# Patient Record
Sex: MALE | Race: WHITE
[De-identification: names, ages, dates, MRNs, and addresses within clinical notes are randomized per-mention and may not be internally consistent; named-entity substitution may affect disease eponyms.]

---

## 2019-02-14 ENCOUNTER — HOSPITAL ENCOUNTER (OUTPATIENT)
Dept: HOSPITAL 95 - LAB SHORT | Age: 64
Discharge: HOME | End: 2019-02-14
Attending: OTOLARYNGOLOGY
Payer: COMMERCIAL

## 2019-02-14 DIAGNOSIS — C79.9: ICD-10-CM

## 2019-02-14 DIAGNOSIS — C78.00: Primary | ICD-10-CM

## 2019-03-08 ENCOUNTER — HOSPITAL ENCOUNTER (OUTPATIENT)
Dept: HOSPITAL 95 - ORSCMMR | Age: 64
Discharge: HOME | End: 2019-03-08
Attending: SURGERY
Payer: COMMERCIAL

## 2019-03-08 VITALS — HEIGHT: 70 IN | WEIGHT: 141.01 LBS | BODY MASS INDEX: 20.19 KG/M2

## 2019-03-08 DIAGNOSIS — R01.1: ICD-10-CM

## 2019-03-08 DIAGNOSIS — R06.02: ICD-10-CM

## 2019-03-08 DIAGNOSIS — C10.9: Primary | ICD-10-CM

## 2019-03-08 DIAGNOSIS — Z79.899: ICD-10-CM

## 2019-03-08 DIAGNOSIS — I10: ICD-10-CM

## 2019-03-08 PROCEDURE — B5131ZA FLUOROSCOPY OF RIGHT JUGULAR VEINS USING LOW OSMOLAR CONTRAST, GUIDANCE: ICD-10-PCS | Performed by: SURGERY

## 2019-03-08 PROCEDURE — 05HM33Z INSERTION OF INFUSION DEVICE INTO RIGHT INTERNAL JUGULAR VEIN, PERCUTANEOUS APPROACH: ICD-10-PCS | Performed by: SURGERY

## 2019-03-08 PROCEDURE — C1788 PORT, INDWELLING, IMP: HCPCS

## 2019-03-08 NOTE — NUR
Ambulatory in Day Surgery History, Chart, Medications and Allergies reviewed
before start of procedure.Lungs clear T/O to Auscultation.  Patient confirms
NPO status and agrees with scheduled surgery.  Patient reports completing
Chlorhexadine shower X2 prior to admission to hospital.Surgical site prepped
with 2% Chlorhexidine cloth wipe.  Patient States Post-Procedure ride home has
been arranged.

## 2019-03-08 NOTE — NUR
Discharge instructions reviewed with patient. Patient verbalizes understanding.
Copy given to patient to take home.
Discharged via wheelchair to private car for ride home.PATIENT AND S/O DENY
ANY QUESTIONS OR CONCERNS R/T DISCHARGE

## 2019-03-12 ENCOUNTER — HOSPITAL ENCOUNTER (OUTPATIENT)
Dept: HOSPITAL 95 - CT | Age: 64
Discharge: HOME | End: 2019-03-12
Attending: INTERNAL MEDICINE
Payer: COMMERCIAL

## 2019-03-12 DIAGNOSIS — Z88.5: ICD-10-CM

## 2019-03-12 DIAGNOSIS — M10.9: ICD-10-CM

## 2019-03-12 DIAGNOSIS — C78.7: Primary | ICD-10-CM

## 2019-03-12 DIAGNOSIS — Z85.810: ICD-10-CM

## 2019-03-12 DIAGNOSIS — I10: ICD-10-CM

## 2019-03-12 DIAGNOSIS — C78.02: ICD-10-CM

## 2019-03-12 DIAGNOSIS — C78.01: ICD-10-CM

## 2019-03-12 DIAGNOSIS — C10.9: ICD-10-CM

## 2019-03-12 DIAGNOSIS — M19.90: ICD-10-CM

## 2019-03-12 NOTE — NUR
PT DENIES ANY PAIN, NAUSEA, SOB. SITE TO R CHECT WALL WITH BANDAID CLEAN DRY
INTAT. NO SWELLING. PT REQUESTING TO AMBULATE OUTR

## 2019-05-29 ENCOUNTER — HOSPITAL ENCOUNTER (INPATIENT)
Dept: HOSPITAL 95 - ER | Age: 64
LOS: 10 days | DRG: 871 | End: 2019-06-08
Attending: HOSPITALIST | Admitting: HOSPITALIST
Payer: COMMERCIAL

## 2019-05-29 VITALS — BODY MASS INDEX: 21.58 KG/M2 | WEIGHT: 142.42 LBS | HEIGHT: 67.99 IN

## 2019-05-29 DIAGNOSIS — R56.9: ICD-10-CM

## 2019-05-29 DIAGNOSIS — E87.6: ICD-10-CM

## 2019-05-29 DIAGNOSIS — I63.9: ICD-10-CM

## 2019-05-29 DIAGNOSIS — E87.2: ICD-10-CM

## 2019-05-29 DIAGNOSIS — E78.00: ICD-10-CM

## 2019-05-29 DIAGNOSIS — E87.1: ICD-10-CM

## 2019-05-29 DIAGNOSIS — G81.94: ICD-10-CM

## 2019-05-29 DIAGNOSIS — C78.7: ICD-10-CM

## 2019-05-29 DIAGNOSIS — G93.41: ICD-10-CM

## 2019-05-29 DIAGNOSIS — E86.0: ICD-10-CM

## 2019-05-29 DIAGNOSIS — C01: ICD-10-CM

## 2019-05-29 DIAGNOSIS — C13.9: ICD-10-CM

## 2019-05-29 DIAGNOSIS — A41.9: Primary | ICD-10-CM

## 2019-05-29 DIAGNOSIS — I10: ICD-10-CM

## 2019-05-29 DIAGNOSIS — C78.01: ICD-10-CM

## 2019-05-29 DIAGNOSIS — D64.81: ICD-10-CM

## 2019-05-29 DIAGNOSIS — C79.89: ICD-10-CM

## 2019-05-29 DIAGNOSIS — C78.02: ICD-10-CM

## 2019-05-29 DIAGNOSIS — C77.1: ICD-10-CM

## 2019-05-29 LAB
ALBUMIN SERPL BCP-MCNC: 3.3 G/DL (ref 3.4–5)
ALBUMIN/GLOB SERPL: 0.9 {RATIO} (ref 0.8–1.8)
ALT SERPL W P-5'-P-CCNC: 20 U/L (ref 12–78)
ANION GAP SERPL CALCULATED.4IONS-SCNC: 18 MMOL/L (ref 6–16)
AST SERPL W P-5'-P-CCNC: 11 U/L (ref 12–37)
BASOPHILS # BLD: 0 K/MM3 (ref 0–0.23)
BASOPHILS NFR BLD: 0 % (ref 0–2)
BILIRUB SERPL-MCNC: 0.2 MG/DL (ref 0.1–1)
BUN SERPL-MCNC: 36 MG/DL (ref 8–24)
CALCIUM SERPL-MCNC: 8.2 MG/DL (ref 8.5–10.1)
CHLORIDE SERPL-SCNC: 99 MMOL/L (ref 98–108)
CO2 SERPL-SCNC: 17 MMOL/L (ref 21–32)
CREAT SERPL-MCNC: 1.02 MG/DL (ref 0.6–1.2)
DEPRECATED RDW RBC AUTO: 80.2 FL (ref 35.1–46.3)
EOSINOPHIL # BLD: 0 K/MM3 (ref 0–0.68)
EOSINOPHIL NFR BLD: 0 % (ref 0–6)
ERYTHROCYTE [DISTWIDTH] IN BLOOD BY AUTOMATED COUNT: 22 % (ref 11.7–14.2)
GLOBULIN SER CALC-MCNC: 3.6 G/DL (ref 2.2–4)
GLUCOSE SERPL-MCNC: 188 MG/DL (ref 70–99)
HCT VFR BLD AUTO: 28.9 % (ref 37–53)
HGB BLD-MCNC: 9.5 G/DL (ref 13.5–17.5)
LYMPHOCYTES # BLD: 2.29 K/MM3 (ref 0.84–5.2)
LYMPHOCYTES NFR BLD: 9 % (ref 21–46)
MCHC RBC AUTO-ENTMCNC: 32.9 G/DL (ref 31.5–36.5)
MCV RBC AUTO: 101 FL (ref 80–100)
MONOCYTES # BLD: 0.5 K/MM3 (ref 0.16–1.47)
MONOCYTES NFR BLD: 2 % (ref 4–13)
NEUTS BAND NFR BLD MANUAL: 10 % (ref 0–8)
NEUTS SEG # BLD MANUAL: 22.68 K/MM3 (ref 1.96–9.15)
NEUTS SEG NFR BLD MANUAL: 79 % (ref 41–73)
NRBC # BLD AUTO: 0 K/MM3 (ref 0–0.02)
NRBC BLD AUTO-RTO: 0 /100 WBC (ref 0–0.2)
PLATELET # BLD AUTO: 251 K/MM3 (ref 150–400)
POTASSIUM SERPL-SCNC: 4.4 MMOL/L (ref 3.5–5.5)
PROT SERPL-MCNC: 6.9 G/DL (ref 6.4–8.2)
SODIUM SERPL-SCNC: 134 MMOL/L (ref 136–145)
TOTAL CELLS COUNTED BLD: 100

## 2019-05-29 PROCEDURE — A9270 NON-COVERED ITEM OR SERVICE: HCPCS

## 2019-05-29 PROCEDURE — P9016 RBC LEUKOCYTES REDUCED: HCPCS

## 2019-05-29 PROCEDURE — G0378 HOSPITAL OBSERVATION PER HR: HCPCS

## 2019-05-29 PROCEDURE — A9577 INJ MULTIHANCE: HCPCS

## 2019-05-29 PROCEDURE — G0515 COGNITIVE SKILLS DEVELOPMENT: HCPCS

## 2019-05-30 LAB
ALBUMIN SERPL BCP-MCNC: 3.1 G/DL (ref 3.4–5)
ALBUMIN/GLOB SERPL: 0.9 {RATIO} (ref 0.8–1.8)
ALT SERPL W P-5'-P-CCNC: 18 U/L (ref 12–78)
ANION GAP SERPL CALCULATED.4IONS-SCNC: 7 MMOL/L (ref 6–16)
AST SERPL W P-5'-P-CCNC: 14 U/L (ref 12–37)
BILIRUB SERPL-MCNC: 0.4 MG/DL (ref 0.1–1)
BUN SERPL-MCNC: 28 MG/DL (ref 8–24)
CALCIUM SERPL-MCNC: 8.2 MG/DL (ref 8.5–10.1)
CHLORIDE SERPL-SCNC: 99 MMOL/L (ref 98–108)
CO2 SERPL-SCNC: 25 MMOL/L (ref 21–32)
CREAT SERPL-MCNC: 0.95 MG/DL (ref 0.6–1.2)
DEPRECATED RDW RBC AUTO: 77.3 FL (ref 35.1–46.3)
ERYTHROCYTE [DISTWIDTH] IN BLOOD BY AUTOMATED COUNT: 21.8 % (ref 11.7–14.2)
GLOBULIN SER CALC-MCNC: 3.4 G/DL (ref 2.2–4)
GLUCOSE SERPL-MCNC: 89 MG/DL (ref 70–99)
HCT VFR BLD AUTO: 26.6 % (ref 37–53)
HGB BLD-MCNC: 8.7 G/DL (ref 13.5–17.5)
MCHC RBC AUTO-ENTMCNC: 32.7 G/DL (ref 31.5–36.5)
MCV RBC AUTO: 99 FL (ref 80–100)
NRBC # BLD AUTO: 0 K/MM3 (ref 0–0.02)
NRBC BLD AUTO-RTO: 0 /100 WBC (ref 0–0.2)
PLATELET # BLD AUTO: 215 K/MM3 (ref 150–400)
POTASSIUM SERPL-SCNC: 4 MMOL/L (ref 3.5–5.5)
PROT SERPL-MCNC: 6.5 G/DL (ref 6.4–8.2)
SODIUM SERPL-SCNC: 131 MMOL/L (ref 136–145)
SP GR SPEC: 1.01 (ref 1–1.02)
UROBILINOGEN UR STRIP-MCNC: (no result) MG/DL

## 2019-05-30 NOTE — NUR
PT A/OX3, PLEASANT AND COOPERATIVE, THE PT IS UP WITH 1 ASSIST TO THE
BATHROOM, THE PT APPEARED TO BE BREATHING EASILY T/O THE SHIFT, THE PT DENIED
ANY PAIN TODAY, THIS AFTERNOON THE PT HAD AN EPISODE IN WHICH HE WAS
HALUCINATING AND HAD A BLANK STARE ON HIS FACE, DID NOT APPEAR TO BE SEIZURE
ACTIVTY AT THAT TIME, FAMILY HAS BEEN WITH THE PT T/O THE DAY, DR. SILVER WAS
CONSULTED THIS AM, HOWEVER HAS NOT BEEN IN TO SEE THE PT SO FAR THIS SHIFT,
CALL LIGHT IN REACH, WILL CONTINUE TO MONITOR AND ASSESS FOR CHANGES

## 2019-05-30 NOTE — NUR
SUMMARY: NEW ADMIT SEIZURE ACTIVITY ON HOSPITALIST SERVICE. NO SEIZURE
ACTIVITY NOTED THIS SHIFT, VSS, ROOM AIR, AFEBRILE, TELE NS IN 60'S.
PT USES CALL LIGHT APPROPRIATELY, REMAINS A&O X4. AWAIT PT VOID FOR URINE
SPECIMEN COLLECTION.

## 2019-05-30 NOTE — NUR
0208 ADMIT: PT ARRIVES FROM ER VIA GOURNEY WITH SOJEFFY, @ BEDSIDE;
TRANSFERS SELF TO BED AND APPEARS IMPULSIVE AND MILDLY UNSTEADY. PT DENIES
DIZZINESS, LIGHTHEADEDNESS OR SOB WITH TRANSFER AND AMBULATION INTO ROOM. PT
AND SO EDUCATED R/T ROOM, BED, CALL LIGHT AND FALL PRECAUTIONS. PT URINAL
PLACED AT BEDSIDE AND BED ALARM ENGAGED. PT ADMITS TO BEING IMPULSIVE AND
FORGETFUL @ TIMES AND AGREES TO USE CALL LIGHT PRIOR TO BRP OR GETTING OUT OF
BED.

## 2019-05-31 LAB
ALBUMIN SERPL BCP-MCNC: 2.9 G/DL (ref 3.4–5)
ANION GAP SERPL CALCULATED.4IONS-SCNC: 8 MMOL/L (ref 6–16)
BASOPHILS # BLD AUTO: 0.02 K/MM3 (ref 0–0.23)
BASOPHILS NFR BLD AUTO: 0 % (ref 0–2)
BUN SERPL-MCNC: 21 MG/DL (ref 8–24)
CALCIUM SERPL-MCNC: 7.6 MG/DL (ref 8.5–10.1)
CHLORIDE SERPL-SCNC: 94 MMOL/L (ref 98–108)
CO2 SERPL-SCNC: 26 MMOL/L (ref 21–32)
CREAT SERPL-MCNC: 0.84 MG/DL (ref 0.6–1.2)
DEPRECATED RDW RBC AUTO: 75.5 FL (ref 35.1–46.3)
EOSINOPHIL # BLD AUTO: 0.01 K/MM3 (ref 0–0.68)
EOSINOPHIL NFR BLD AUTO: 0 % (ref 0–6)
ERYTHROCYTE [DISTWIDTH] IN BLOOD BY AUTOMATED COUNT: 20.9 % (ref 11.7–14.2)
GLUCOSE SERPL-MCNC: 86 MG/DL (ref 70–99)
HCT VFR BLD AUTO: 25 % (ref 37–53)
HGB BLD-MCNC: 8.4 G/DL (ref 13.5–17.5)
IMM GRANULOCYTES # BLD AUTO: 0.12 K/MM3 (ref 0–0.1)
IMM GRANULOCYTES NFR BLD AUTO: 1 % (ref 0–1)
LYMPHOCYTES # BLD AUTO: 2.07 K/MM3 (ref 0.84–5.2)
LYMPHOCYTES NFR BLD AUTO: 16 % (ref 21–46)
MCHC RBC AUTO-ENTMCNC: 33.6 G/DL (ref 31.5–36.5)
MCV RBC AUTO: 99 FL (ref 80–100)
MONOCYTES # BLD AUTO: 0.58 K/MM3 (ref 0.16–1.47)
MONOCYTES NFR BLD AUTO: 5 % (ref 4–13)
NEUTROPHILS # BLD AUTO: 10.04 K/MM3 (ref 1.96–9.15)
NEUTROPHILS NFR BLD AUTO: 78 % (ref 41–73)
NRBC # BLD AUTO: 0 K/MM3 (ref 0–0.02)
NRBC BLD AUTO-RTO: 0 /100 WBC (ref 0–0.2)
PHOSPHATE SERPL-MCNC: 3.1 MG/DL (ref 2.5–4.9)
PLATELET # BLD AUTO: 176 K/MM3 (ref 150–400)
POTASSIUM SERPL-SCNC: 3.3 MMOL/L (ref 3.5–5.5)
SODIUM SERPL-SCNC: 128 MMOL/L (ref 136–145)

## 2019-05-31 NOTE — NUR
PATIENT INCREASED CONFUSION. SPOUSE/RN REQUEST ATIVAN 1 MG PRN FOR THIS
EVENING. HOSPITALIST DR MARKHAM ORDERED ATIVAN PO 1 MG X ONE.

## 2019-05-31 NOTE — NUR
SHIFT SUMMARY
PT SLEPT FAIR, REQUESTED HOME DOSE OF PO ATIVAN SO THAT HE COULD SLEEP.
HOSPITALIST CALLD AND ORDER RECEIVED. IVF'S INFUSING PER PUMP WITHOUT
DIFFICULTY. WIFE WENT HOME FOR THE NIGHT. PT REQUESTED TYLENOL FOR THE CHILLS
EARLY ON IN THE EVENING. TELE SHOWS NSR. NO ACUTE EVENTS NOTED DURING THE
NIGHT, WILL CONTINUE TO MONITOR.

## 2019-05-31 NOTE — NUR
NOTIFIED DR. ARREOLA PT PULLED IV OUT AND IS CONFUSED. DR. ARREOLA SAID
TO PLACE ANOTHER IV FOR ANTIBIOTICS. NOTIFIED
CHARGE RN, VINEET
PT PULLED OUT IV, UNSTEADY
ON THEIR FEET, CONFUSED, ATTEMPTING TO GET OUT OF BED AND DOES NOT
CALL APPROPRIATELY. PT MOVED TO SPECIAL CARE UNIT. REPORT GIVEN TO TAWANDA EDMOND.

## 2019-05-31 NOTE — NUR
FAMILY/FRIEND RN REPORTS PATIENT HAVING VISUAL HALLUCINATIONS FOR FIRST TIME.
WILL CONTINUE TO MONITOR.

## 2019-06-01 LAB
ALBUMIN SERPL BCP-MCNC: 3.2 G/DL (ref 3.4–5)
ANION GAP SERPL CALCULATED.4IONS-SCNC: 12 MMOL/L (ref 6–16)
BASOPHILS # BLD AUTO: 0.01 K/MM3 (ref 0–0.23)
BASOPHILS NFR BLD AUTO: 0 % (ref 0–2)
BUN SERPL-MCNC: 26 MG/DL (ref 8–24)
CALCIUM SERPL-MCNC: 7.9 MG/DL (ref 8.5–10.1)
CHLORIDE SERPL-SCNC: 90 MMOL/L (ref 98–108)
CO2 SERPL-SCNC: 23 MMOL/L (ref 21–32)
CREAT SERPL-MCNC: 0.94 MG/DL (ref 0.6–1.2)
DEPRECATED RDW RBC AUTO: 70.4 FL (ref 35.1–46.3)
EOSINOPHIL # BLD AUTO: 0.02 K/MM3 (ref 0–0.68)
EOSINOPHIL NFR BLD AUTO: 0 % (ref 0–6)
ERYTHROCYTE [DISTWIDTH] IN BLOOD BY AUTOMATED COUNT: 20.4 % (ref 11.7–14.2)
GLUCOSE SERPL-MCNC: 94 MG/DL (ref 70–99)
HCT VFR BLD AUTO: 26.5 % (ref 37–53)
HGB BLD-MCNC: 9.2 G/DL (ref 13.5–17.5)
IMM GRANULOCYTES # BLD AUTO: 0.05 K/MM3 (ref 0–0.1)
IMM GRANULOCYTES NFR BLD AUTO: 1 % (ref 0–1)
LYMPHOCYTES # BLD AUTO: 2.43 K/MM3 (ref 0.84–5.2)
LYMPHOCYTES NFR BLD AUTO: 22 % (ref 21–46)
MCHC RBC AUTO-ENTMCNC: 34.7 G/DL (ref 31.5–36.5)
MCV RBC AUTO: 96 FL (ref 80–100)
MONOCYTES # BLD AUTO: 0.57 K/MM3 (ref 0.16–1.47)
MONOCYTES NFR BLD AUTO: 5 % (ref 4–13)
NEUTROPHILS # BLD AUTO: 7.9 K/MM3 (ref 1.96–9.15)
NEUTROPHILS NFR BLD AUTO: 72 % (ref 41–73)
NRBC # BLD AUTO: 0 K/MM3 (ref 0–0.02)
NRBC BLD AUTO-RTO: 0 /100 WBC (ref 0–0.2)
PHOSPHATE SERPL-MCNC: 2.4 MG/DL (ref 2.5–4.9)
PLATELET # BLD AUTO: 169 K/MM3 (ref 150–400)
POTASSIUM SERPL-SCNC: 3.7 MMOL/L (ref 3.5–5.5)
SODIUM SERPL-SCNC: 125 MMOL/L (ref 136–145)

## 2019-06-01 NOTE — NUR
LEFT SIDE PERIPHERAL VISION DEFICIT NOTED WHEN UP TO SHOWER. PATIENT NOT ABLE
TO ORIENT TO LEFT SIDE WHEN ASKED. TAKES A FEW REMINDERS FOR PATIENT TO TURN
TO LEFT.

## 2019-06-01 NOTE — NUR
PATIENT HALLUCINATING AND SETTING OFF BED ALARM SWINGING LEGS OFF BED TRYING
TO EXIT X 3. PATIENT BACK INTO BED AND BED ALARM ACTIVATED.

## 2019-06-01 NOTE — NUR
SHIFT SUMMARY
PATIENT IS HAVING INCREASED CONFUSION AND VISUAL HALLUCINATIONS. SIGNIFICANT
OTHER-RN REPORTS THESE ARE BOTH NEW. ALSO NOTED IS INCREASED RESTLESS ARMS AND
HANDS PULLING AT IV, CORDS, TELE LEADS AND TELE BOX PULLED APART. PATIENT
PULLING AT SIDE RAILS AND INCREASE AGIATATION. PATIENT NOT ABLE TO REORIENT AT
THIS TIMES. HOSPITALIST DR MARS ORDERED POSEY VEST AND BILATERAL SOFT
WRIST RESTRAINTS. PIV REMAINS INTACT. IV ABX INFUSED. TELE TECH REPORTS
NSR/PVC AT 84. TAKES MEDS WHOLE WITH WATER. DENIES PAIN, SOB, AND N/V. ATIVAN
PO 1 MG GIVEN X ONE AT START OF SHIFT AND .5 MG ATIVAN PO GIVEN X ONE FOR
AGITATION ORDERD BY HOSPITALIST. CALL LIGHT IN REACH. BED IN LOWEST POSITION.
BED ALARM ACTIVATED. WILL CONTINUE TO MONITOR UNTIL DAY SHIFT NURSE ASSUMES
CARE.

## 2019-06-01 NOTE — NUR
HOSPITALIST DR MARS ORDERED POSEY VEST AND BILATERAL SOFT WRIST
RESTRAINTS. PATIENT WITH INCREASED AGITATION AND HOSPITALIST ORDERED
ATIVAN .5 MG PO X ONE.

## 2019-06-01 NOTE — NUR
PATIENT CONFUSION INCREASING. 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
ASK IF WE WILL BE RACING TONIGHT. PATIENT PULLING AT IV AND PULLED HUB OFF.
PATIENT REPORTS HE WANTS TO GET UP AND EXIT BED. PATIENT PULLING AT TELE LEADS
AND BP CUFF ATTACHED TO BED.
PATIENT UNABLE TO REORIENT AT THIS TIME. RESTLESS ARM AND HANDS PULLING APART
TELE BOX. BED ALARM ACITVATED. WILL CONTINUE TO MONITOR.

## 2019-06-01 NOTE — NUR
SHIFT SUMMARY
 
PATIENT PLEASANT. STILL GAINING MORE CONFUSION. DOCTOR IS AWARE. ALL
ANTIBIOTICS HAVE BEEN STOPPED AT THIS TIME AND FLUIDS HAVE BEEN STARTED.

## 2019-06-01 NOTE — NUR
PATIENT BED EXIT AND RESISTING STAFF TO GET BACK INTO BED. PATIENT ASKING IF
WE ARE TAKING FLYING LESSONS IN MORNING. PATIENT REORIENTED INTO BED. WILL
CONTINUE TO MONITOR.

## 2019-06-02 LAB
ALBUMIN SERPL BCP-MCNC: 2.8 G/DL (ref 3.4–5)
ANION GAP SERPL CALCULATED.4IONS-SCNC: 10 MMOL/L (ref 6–16)
ANION GAP SERPL CALCULATED.4IONS-SCNC: 6 MMOL/L (ref 6–16)
ANION GAP SERPL CALCULATED.4IONS-SCNC: 9 MMOL/L (ref 6–16)
BASOPHILS # BLD AUTO: 0.01 K/MM3 (ref 0–0.23)
BASOPHILS NFR BLD AUTO: 0 % (ref 0–2)
BUN SERPL-MCNC: 23 MG/DL (ref 8–24)
BUN SERPL-MCNC: 24 MG/DL (ref 8–24)
BUN SERPL-MCNC: 24 MG/DL (ref 8–24)
CALCIUM SERPL-MCNC: 7.5 MG/DL (ref 8.5–10.1)
CALCIUM SERPL-MCNC: 7.5 MG/DL (ref 8.5–10.1)
CALCIUM SERPL-MCNC: 7.6 MG/DL (ref 8.5–10.1)
CHLORIDE SERPL-SCNC: 91 MMOL/L (ref 98–108)
CHLORIDE SERPL-SCNC: 91 MMOL/L (ref 98–108)
CHLORIDE SERPL-SCNC: 92 MMOL/L (ref 98–108)
CHOLEST SERPL-MCNC: 219 MG/DL (ref 50–200)
CHOLEST/HDLC SERPL: 5.3 {RATIO}
CO2 SERPL-SCNC: 23 MMOL/L (ref 21–32)
CO2 SERPL-SCNC: 23 MMOL/L (ref 21–32)
CO2 SERPL-SCNC: 26 MMOL/L (ref 21–32)
CREAT SERPL-MCNC: 0.78 MG/DL (ref 0.6–1.2)
CREAT SERPL-MCNC: 0.79 MG/DL (ref 0.6–1.2)
CREAT SERPL-MCNC: 0.87 MG/DL (ref 0.6–1.2)
DEPRECATED RDW RBC AUTO: 71.6 FL (ref 35.1–46.3)
EOSINOPHIL # BLD AUTO: 0.04 K/MM3 (ref 0–0.68)
EOSINOPHIL NFR BLD AUTO: 1 % (ref 0–6)
ERYTHROCYTE [DISTWIDTH] IN BLOOD BY AUTOMATED COUNT: 20 % (ref 11.7–14.2)
GLUCOSE SERPL-MCNC: 119 MG/DL (ref 70–99)
GLUCOSE SERPL-MCNC: 139 MG/DL (ref 70–99)
GLUCOSE SERPL-MCNC: 93 MG/DL (ref 70–99)
HCT VFR BLD AUTO: 24.6 % (ref 37–53)
HDLC SERPL-MCNC: 41 MG/DL (ref 39–?)
HGB BLD-MCNC: 8.7 G/DL (ref 13.5–17.5)
IMM GRANULOCYTES # BLD AUTO: 0.03 K/MM3 (ref 0–0.1)
IMM GRANULOCYTES NFR BLD AUTO: 1 % (ref 0–1)
LDLC SERPL CALC-MCNC: 149 MG/DL (ref 0–110)
LDLC/HDLC SERPL: 3.6 {RATIO}
LYMPHOCYTES # BLD AUTO: 1.93 K/MM3 (ref 0.84–5.2)
LYMPHOCYTES NFR BLD AUTO: 38 % (ref 21–46)
MCHC RBC AUTO-ENTMCNC: 35.4 G/DL (ref 31.5–36.5)
MCV RBC AUTO: 98 FL (ref 80–100)
MONOCYTES # BLD AUTO: 0.34 K/MM3 (ref 0.16–1.47)
MONOCYTES NFR BLD AUTO: 7 % (ref 4–13)
NEUTROPHILS # BLD AUTO: 2.72 K/MM3 (ref 1.96–9.15)
NEUTROPHILS NFR BLD AUTO: 54 % (ref 41–73)
NRBC # BLD AUTO: 0 K/MM3 (ref 0–0.02)
NRBC BLD AUTO-RTO: 0 /100 WBC (ref 0–0.2)
PHOSPHATE SERPL-MCNC: 2.3 MG/DL (ref 2.5–4.9)
PLATELET # BLD AUTO: 145 K/MM3 (ref 150–400)
POTASSIUM SERPL-SCNC: 3.3 MMOL/L (ref 3.5–5.5)
POTASSIUM SERPL-SCNC: 3.5 MMOL/L (ref 3.5–5.5)
POTASSIUM SERPL-SCNC: 3.9 MMOL/L (ref 3.5–5.5)
SODIUM SERPL-SCNC: 123 MMOL/L (ref 136–145)
SODIUM SERPL-SCNC: 124 MMOL/L (ref 136–145)
SODIUM SERPL-SCNC: 124 MMOL/L (ref 136–145)
TRIGL SERPL-MCNC: 144 MG/DL (ref 30–160)
VLDLC SERPL CALC-MCNC: 28 MG/DL (ref 6–32)

## 2019-06-02 NOTE — NUR
RECEIVED REPORT FROM TAWANDA HERNANDEZ; PT CURRENTLY NOT IN RESTRAINTS. RESTING WITH
EYES CLOSED; RR EVEN AND UNLABORED. BED ALARM ACTIVATED.

## 2019-06-02 NOTE — NUR
SHIFT SUMMARY
CANCER WITH METS. NEW CVA ON MRI TODAY. PERIPHERAL NEGLECT. 1-2 PERSON
TRANSFER. EATING AND DRINKING WELL. CONFUSED. PT'S SIGNIFICANT OTHER IS [facility]
HOSPICE NURSE. DECONDITIONED. PLEASANT, COOPERATIVE. NA LOW (123).
INCONTINENT.

## 2019-06-02 NOTE — NUR
SHIFT SUMMARY
PATIENT HAVING NEW LEFT SIDE PERIPHERAL VISION DEFICIT. NOTED WHEN PATIENT UP
TO TAKE A SHOWER AND NOT ABLE TO ORIENT TO THE LEFT SIDE WHEN GIVEN
DIRECTIONS WHICH WAY TO TURN. SIGNIFICANT OTHER PRESENT FOR HALF THE SHIFT.
AXOX 2-3 WITH CONFUSION. NO VISUAL OR AUDITORY HALLUCINATIONS NOTED. PIV
REMAINS INTACT. NS INFUSING AT 30 mL/HR. DENIES PAIN, SOB, AND N/V.
VSS/AFEBRILE. RESTRAINT MANAGEMENT. LESS AGITATION NOTED. PATIENT OUT OF
RESTRAINTS WHEN SIGNIFICANT OTHER-RN PRESENT. CALL LIGHT IN REACH. BED IN
LOWEST POSITION. BED ALARM ACTIVATED. WILL CONTINUE TO MONITOR UNTIL DAY SHIFT
NURSE ASSUMES CARE.

## 2019-06-03 LAB
ALBUMIN SERPL BCP-MCNC: 2.9 G/DL (ref 3.4–5)
ANION GAP SERPL CALCULATED.4IONS-SCNC: 9 MMOL/L (ref 6–16)
BASOPHILS # BLD AUTO: 0 K/MM3 (ref 0–0.23)
BASOPHILS NFR BLD AUTO: 0 % (ref 0–2)
BUN SERPL-MCNC: 20 MG/DL (ref 8–24)
CALCIUM SERPL-MCNC: 7.7 MG/DL (ref 8.5–10.1)
CHLORIDE SERPL-SCNC: 92 MMOL/L (ref 98–108)
CO2 SERPL-SCNC: 22 MMOL/L (ref 21–32)
CREAT SERPL-MCNC: 0.79 MG/DL (ref 0.6–1.2)
DEPRECATED RDW RBC AUTO: 75.3 FL (ref 35.1–46.3)
EOSINOPHIL # BLD AUTO: 0.05 K/MM3 (ref 0–0.68)
EOSINOPHIL NFR BLD AUTO: 1 % (ref 0–6)
ERYTHROCYTE [DISTWIDTH] IN BLOOD BY AUTOMATED COUNT: 20.2 % (ref 11.7–14.2)
GLUCOSE SERPL-MCNC: 88 MG/DL (ref 70–99)
HCT VFR BLD AUTO: 26.9 % (ref 37–53)
HGB BLD-MCNC: 9.1 G/DL (ref 13.5–17.5)
IMM GRANULOCYTES # BLD AUTO: 0.02 K/MM3 (ref 0–0.1)
IMM GRANULOCYTES NFR BLD AUTO: 0 % (ref 0–1)
LYMPHOCYTES # BLD AUTO: 2.28 K/MM3 (ref 0.84–5.2)
LYMPHOCYTES NFR BLD AUTO: 49 % (ref 21–46)
MCHC RBC AUTO-ENTMCNC: 33.8 G/DL (ref 31.5–36.5)
MCV RBC AUTO: 101 FL (ref 80–100)
MONOCYTES # BLD AUTO: 0.42 K/MM3 (ref 0.16–1.47)
MONOCYTES NFR BLD AUTO: 9 % (ref 4–13)
NEUTROPHILS # BLD AUTO: 1.85 K/MM3 (ref 1.96–9.15)
NEUTROPHILS NFR BLD AUTO: 40 % (ref 41–73)
NRBC # BLD AUTO: 0 K/MM3 (ref 0–0.02)
NRBC BLD AUTO-RTO: 0 /100 WBC (ref 0–0.2)
PHOSPHATE SERPL-MCNC: 2.1 MG/DL (ref 2.5–4.9)
PLATELET # BLD AUTO: 135 K/MM3 (ref 150–400)
POTASSIUM SERPL-SCNC: 3.5 MMOL/L (ref 3.5–5.5)
SODIUM SERPL-SCNC: 123 MMOL/L (ref 136–145)

## 2019-06-03 NOTE — NUR
SUMMARY
PT HAD MRI HEAD YESTERDAY, DR MORALES EXPLAIN TO PT/FAMILY +CVA. HE HAS L EYE
VISUAL DEFICITS/NEGLECT. L ARM UNCOORDINATION.  & DORSIFLEX STRONG. PT/OT
EVAL TODAY. ST EVAL, ORDER PUREE/NECTAR THICK DIET W SUPERVISION. PT IS ABLE
TO ANSWER BASIC QUESTIONS, RECALL EVENTS, RECOGNIZE FAMILY HOWEVER @ X'S MAKES
BIZZARE STATEMENTS, DISORIENTED. ECHO DONE TODAY. PT HAS HX TONGUE CA W METS,
RECNT CHEMO TX, DR SILVER IN TO SEE PT/FAMILY THIS AFTERNOON & DISCUSS FUTURE
TX. VSS.

## 2019-06-03 NOTE — NUR
SHIFT SUMMARY
PATIENT HAD NO ACUTE CHANGES OBSERVED THIS SHIFT. FAMILY AND SIGNIFICANT
OTHER-RN PRESENT FIRST HALF OF SHIFT. AXOX 2-3 W/CONFUSION. TAKES MEDS WHOLE
WITH WATER. ONE ASSIST TO BATHROOM. VSS/AFEBRILE. DENIES PAIN, SOB, AND N/V.
PIV REMAINS INTACT. LASIX 20 MG X ONE GIVEN PER EMAR. RESTRAINT MANAGEMENT. NO
HALLUCINATIONS. NS INFUSING AT 50 mL/HR. PATIENT AGITATED FOR AN HOUR AND BACK
RESTING. TELE TECH REPORTS NSR 64. CALL LIGHT IN REACH. BED IN LOWEST
POSITION. WILL CONTINUE TO MONITOR UNTIL DAY SHIFT NURSE ASSUMES CARE.

## 2019-06-03 NOTE — NUR
PATIENT ACTIVATED BED ALARM TRYING TO MOVE TO SIDE OF BED. POSEY VEST
READJUSTED. CALL LIGHT IN REACH.

## 2019-06-04 LAB
ALBUMIN SERPL BCP-MCNC: 2.8 G/DL (ref 3.4–5)
ANION GAP SERPL CALCULATED.4IONS-SCNC: 10 MMOL/L (ref 6–16)
BASOPHILS # BLD AUTO: 0 K/MM3 (ref 0–0.23)
BASOPHILS NFR BLD AUTO: 0 % (ref 0–2)
BUN SERPL-MCNC: 19 MG/DL (ref 8–24)
CALCIUM SERPL-MCNC: 7.6 MG/DL (ref 8.5–10.1)
CHLORIDE SERPL-SCNC: 94 MMOL/L (ref 98–108)
CO2 SERPL-SCNC: 22 MMOL/L (ref 21–32)
CREAT SERPL-MCNC: 0.82 MG/DL (ref 0.6–1.2)
DEPRECATED RDW RBC AUTO: 72.1 FL (ref 35.1–46.3)
EOSINOPHIL # BLD AUTO: 0.03 K/MM3 (ref 0–0.68)
EOSINOPHIL NFR BLD AUTO: 1 % (ref 0–6)
ERYTHROCYTE [DISTWIDTH] IN BLOOD BY AUTOMATED COUNT: 19.8 % (ref 11.7–14.2)
GLUCOSE SERPL-MCNC: 87 MG/DL (ref 70–99)
HCT VFR BLD AUTO: 24.8 % (ref 37–53)
HGB BLD-MCNC: 8.7 G/DL (ref 13.5–17.5)
IMM GRANULOCYTES # BLD AUTO: 0.03 K/MM3 (ref 0–0.1)
IMM GRANULOCYTES NFR BLD AUTO: 1 % (ref 0–1)
LYMPHOCYTES # BLD AUTO: 1.82 K/MM3 (ref 0.84–5.2)
LYMPHOCYTES NFR BLD AUTO: 38 % (ref 21–46)
MCHC RBC AUTO-ENTMCNC: 35.1 G/DL (ref 31.5–36.5)
MCV RBC AUTO: 100 FL (ref 80–100)
MONOCYTES # BLD AUTO: 0.43 K/MM3 (ref 0.16–1.47)
MONOCYTES NFR BLD AUTO: 9 % (ref 4–13)
NEUTROPHILS # BLD AUTO: 2.54 K/MM3 (ref 1.96–9.15)
NEUTROPHILS NFR BLD AUTO: 52 % (ref 41–73)
NRBC # BLD AUTO: 0 K/MM3 (ref 0–0.02)
NRBC BLD AUTO-RTO: 0 /100 WBC (ref 0–0.2)
PHOSPHATE SERPL-MCNC: 1.9 MG/DL (ref 2.5–4.9)
PLATELET # BLD AUTO: 146 K/MM3 (ref 150–400)
POTASSIUM SERPL-SCNC: 3.2 MMOL/L (ref 3.5–5.5)
SODIUM SERPL-SCNC: 126 MMOL/L (ref 136–145)

## 2019-06-04 NOTE — NUR
SUMMARY
PT CONTINUES TO DEMONSTRATE L SIDED NEGLEGT, L VISUAL DEFICITS. L ARM
CONTINUES UNCOORDINATED HOWEVER IMPROVED FROM PREVIOUS DAY. PARTICIPATED W
PT/OT TODAY. UP IN CHAIR FOR ALL MEALS W SUPERVISION. INTERACT W MULT FAMILY
MEMBERS/VISITORS. HE IS SOMEWHAT FATIGUED, NAPS INTERMITTANTLY. NA+, K+ LOW,
IV SUPPLEMENTS PROVIDED. VSS. SOCSERV STATE PLAN FOR IRU WHEN APPROP.

## 2019-06-04 NOTE — NUR
SHIFT SUMMARY
PATIENT IS ALERT AND ORIENTED TO SELF AND PLACE, HOWEVER SAYS RANDOM THINGS
LIKE " HELP ME BUILD THIS DOOR FRAME". PATIENT NEGLECTS LEFT SIDE WITH EYE
MOVEMENT, ARM, AND LEG. PATIENT IS ABLE TO STAND AND USES URINAL AND IS
INCONTINENT. DOES NOT FOLLOW COMMANDS WELL, LIKELY DUE TO THE POOR
COORDINATION ON LEFT SIDE. BILATERAL WRIST RESTRAINTS ON, PATIENT CONTINUED TO
GET OUT OF THE BED WITHOUT CALLING. HARD TO REDIREC BACK TO BED. CALL LIGHT IN
REACH. VITALS STABLE.

## 2019-06-05 LAB
ALBUMIN SERPL BCP-MCNC: 2.6 G/DL (ref 3.4–5)
ANION GAP SERPL CALCULATED.4IONS-SCNC: 9 MMOL/L (ref 6–16)
BASOPHILS # BLD AUTO: 0 K/MM3 (ref 0–0.23)
BASOPHILS NFR BLD AUTO: 0 % (ref 0–2)
BUN SERPL-MCNC: 17 MG/DL (ref 8–24)
CALCIUM SERPL-MCNC: 7.7 MG/DL (ref 8.5–10.1)
CHLORIDE SERPL-SCNC: 95 MMOL/L (ref 98–108)
CO2 SERPL-SCNC: 23 MMOL/L (ref 21–32)
CREAT SERPL-MCNC: 0.85 MG/DL (ref 0.6–1.2)
DEPRECATED RDW RBC AUTO: 72.6 FL (ref 35.1–46.3)
EOSINOPHIL # BLD AUTO: 0.02 K/MM3 (ref 0–0.68)
EOSINOPHIL NFR BLD AUTO: 1 % (ref 0–6)
ERYTHROCYTE [DISTWIDTH] IN BLOOD BY AUTOMATED COUNT: 20.3 % (ref 11.7–14.2)
FERRITIN SERPL-MCNC: 542 NG/ML (ref 26–388)
GLUCOSE SERPL-MCNC: 98 MG/DL (ref 70–99)
HCT VFR BLD AUTO: 22 % (ref 37–53)
HGB BLD-MCNC: 7.8 G/DL (ref 13.5–17.5)
IMM GRANULOCYTES # BLD AUTO: 0.03 K/MM3 (ref 0–0.1)
IMM GRANULOCYTES NFR BLD AUTO: 1 % (ref 0–1)
LYMPHOCYTES # BLD AUTO: 1.15 K/MM3 (ref 0.84–5.2)
LYMPHOCYTES NFR BLD AUTO: 26 % (ref 21–46)
MAGNESIUM SERPL-MCNC: 1.1 MG/DL (ref 1.6–2.4)
MCHC RBC AUTO-ENTMCNC: 35.5 G/DL (ref 31.5–36.5)
MCV RBC AUTO: 98 FL (ref 80–100)
MONOCYTES # BLD AUTO: 0.25 K/MM3 (ref 0.16–1.47)
MONOCYTES NFR BLD AUTO: 6 % (ref 4–13)
NEUTROPHILS # BLD AUTO: 2.99 K/MM3 (ref 1.96–9.15)
NEUTROPHILS NFR BLD AUTO: 67 % (ref 41–73)
NRBC # BLD AUTO: 0 K/MM3 (ref 0–0.02)
NRBC BLD AUTO-RTO: 0 /100 WBC (ref 0–0.2)
PHOSPHATE SERPL-MCNC: 2.8 MG/DL (ref 2.5–4.9)
PLATELET # BLD AUTO: 157 K/MM3 (ref 150–400)
POTASSIUM SERPL-SCNC: 3.1 MMOL/L (ref 3.5–5.5)
SODIUM SERPL-SCNC: 127 MMOL/L (ref 136–145)
TIBC SERPL-MCNC: 225 UG/DL (ref 250–450)

## 2019-06-05 NOTE — NUR
SHIFT SUMMARY
PT AXO X4, PLEASANT AND COOPERATIVE WITH CARE. PT CONTINUES TO HAVE LEFT SIDED
DEFICITS, AND NEGLECT. PATIENT WORKED WITH PHYSICAL THERAPY, SEE NOTE. VSS.
DR GALLEGOS NOTIFIED OF HGB OF 7.8 AT 1620, DR GALLEGOS ORDERED 1 UNIT OF PRBC
TO INFUSE. NURSE NOTIFIED AT THIS TIME THAT BLOOD SLIP IS READY. DRESSING
CHANGE COMPLETED THIS SHIFT, SEE PHOTO IN CHART. PT'S FAMILY BELIEVES THAT THE
WOUND IS RELATED TO THE RESTRAINTS THAT PT WAS IN. LOBO BILLINGSLEY RN, CLINICAL
SUPERVISOR NOTIFIED. FAMILY STATES THAT PATIENT WAS RUBBING AGAINTS AND
FIGHTING THE RESTRAINTS. BED IN LOW POSITION, CALL LIGHT WITHIN REACH. PT UP
TO CHAIR FOR MEALS. PT TRANSFERS WITH 1 ASSIST.

## 2019-06-05 NOTE — NUR
SHIFT SUMMARY
PT SLEEPING IN CHAIR AT BS DURING SHIFT REPORT. PT'S WIFE BESIDE HIM. PT LATER
ASSISTED INTO BED BY CNA. WIFE REMAINED IN RM UNTIL PT WENT TO SLEEP FOR THE
NIGHT. PT C/O MAGALLANES "AT THE BASE OF THE NECK" AND WAS MEDICATED WITH TYLENOL. PT
ALSO HAVING HICCUPS; MEDICATED WITH REGLAN PER WIFE REQUEST. MEDS ADMIN
CRUSHED IN ICE CREAM. PT WITH SWALLOWING DIFFICULTY AND POCKETING R/T HX OF
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 PUREE DIET WITH THICKENED LIQUIDS.
PT WITH FLAT AFFECT. HAS SOME DIFFICULTY FOLLOWING DIRECTIONS. L EYE VEERS FAR
LEFT. L SIDE EXTREMITIES VERY WEAK. MEPILEX TO L HIP; PER SHIFT REPORT, L HIP
ABRASIONS D/T BLISTERS FROM UNKNOWN CAUSE. PT CONTINUES TO REST QUIETLY. NO
S/SX OF DISTRESS NOTED. RESP E/U. SR @ 69 PER TELE MX. CALL LT IN REACH.

## 2019-06-06 LAB
ALBUMIN SERPL BCP-MCNC: 2.4 G/DL (ref 3.4–5)
ANION GAP SERPL CALCULATED.4IONS-SCNC: 8 MMOL/L (ref 6–16)
BASOPHILS # BLD AUTO: 0.01 K/MM3 (ref 0–0.23)
BASOPHILS NFR BLD AUTO: 0 % (ref 0–2)
BUN SERPL-MCNC: 19 MG/DL (ref 8–24)
CALCIUM SERPL-MCNC: 7.9 MG/DL (ref 8.5–10.1)
CHLORIDE SERPL-SCNC: 98 MMOL/L (ref 98–108)
CO2 SERPL-SCNC: 25 MMOL/L (ref 21–32)
CREAT SERPL-MCNC: 0.86 MG/DL (ref 0.6–1.2)
DEPRECATED RDW RBC AUTO: 70.7 FL (ref 35.1–46.3)
EOSINOPHIL # BLD AUTO: 0.02 K/MM3 (ref 0–0.68)
EOSINOPHIL NFR BLD AUTO: 0 % (ref 0–6)
ERYTHROCYTE [DISTWIDTH] IN BLOOD BY AUTOMATED COUNT: 19.7 % (ref 11.7–14.2)
GLUCOSE SERPL-MCNC: 91 MG/DL (ref 70–99)
HCT VFR BLD AUTO: 25.7 % (ref 37–53)
HGB BLD-MCNC: 8.9 G/DL (ref 13.5–17.5)
IMM GRANULOCYTES # BLD AUTO: 0.03 K/MM3 (ref 0–0.1)
IMM GRANULOCYTES NFR BLD AUTO: 1 % (ref 0–1)
LYMPHOCYTES # BLD AUTO: 1.7 K/MM3 (ref 0.84–5.2)
LYMPHOCYTES NFR BLD AUTO: 33 % (ref 21–46)
MAGNESIUM SERPL-MCNC: 1.4 MG/DL (ref 1.6–2.4)
MCHC RBC AUTO-ENTMCNC: 34.6 G/DL (ref 31.5–36.5)
MCV RBC AUTO: 98 FL (ref 80–100)
MONOCYTES # BLD AUTO: 0.31 K/MM3 (ref 0.16–1.47)
MONOCYTES NFR BLD AUTO: 6 % (ref 4–13)
NEUTROPHILS # BLD AUTO: 3.16 K/MM3 (ref 1.96–9.15)
NEUTROPHILS NFR BLD AUTO: 60 % (ref 41–73)
NRBC # BLD AUTO: 0 K/MM3 (ref 0–0.02)
NRBC BLD AUTO-RTO: 0 /100 WBC (ref 0–0.2)
PHOSPHATE SERPL-MCNC: 1.9 MG/DL (ref 2.5–4.9)
PLATELET # BLD AUTO: 141 K/MM3 (ref 150–400)
POTASSIUM SERPL-SCNC: 3.8 MMOL/L (ref 3.5–5.5)
SODIUM SERPL-SCNC: 131 MMOL/L (ref 136–145)

## 2019-06-06 NOTE — NUR
ASSUMED CARE OF PT-
PER REPORT PT HIGH FALL RISK AND FREQUENTLY TRIES TO GET UP WITHOUT
ASSISTANCE, HAD BEEN IN RESTRAINTS FOR SAFETY, BUT CURRENTLY IS NO LONGER IN
RESTRAINTS. PER REPORT PT WIFE REQUESTED THAT SHE BE CALLED TO SIT WITH HIM
1:1 RATHER THAN USE RESTRAINTS. PT BEHAVIOR THIS MORNING IS PLEASENT, MILDLY
CONFUSED.

## 2019-06-06 NOTE — NUR
SHIFT SUMMARY
PT AWAKE WITH VISITORS IN  AT START OF SHIFT. RECEIVED REPORT, PT TO RECIEVE
1 UNIT PRBC'S. PT'S WIFE INFORMED WHEN LEAVING AND SIGNED BLOOD CONSENT PER PT
REQUEST. HS MEDS GIVEN AND BLOOD STARTED AFTER THAT. PT CO-OP. TOLERATED WELL.
UP TO EOB DURING TRANSFUSION, NEEDING URINAL; SETTING BED ALARM OFF. PT RESTED
WELL THRU OUT THE NIGHT. VS REMAINED STABLE. PT DENIED NEEDS. NO C/O. PER
SHIFT REPORT, PT'S FRIENDS IN EARLIER IN THE DAY PROVIDED CANDY BARS AND
STRAWS FOR DRINKING. PER REPORT, PT FOUND COUGHING AFTERWARDS BY DAY RN.
STRAWS AND LYNDA REMOVED FOR PT SAFETY. PT FOUND WITH HICCUPS AGAIN AT START OF
SHIFT LAST NIGHT. REGLAN GIVEN PER EMAR AND WIFE REQUEST. BED ALARM REMAINS ON
FOR SAFETY. CALL LT IN REACH, THOUGH PT HAS NOT USED THIS SHIFT.

## 2019-06-06 NOTE — NUR
SHIFT SUMMARY-
PT WAS SEEN BY NEUROLOGY. STARTED ON SEIZURE PREVENTION MEDICATION
LEVITIRACETAM FIRST DOSE NOW IV THEN BID PO AFTER THAT. PT HAD A REPEAT CT
SCAN THAT SHOWS ISCHEMIC AREA PREVIOUSLY NOTED IN MRI. CT SCAN ON ADMIT
NEGATIVE FOR INFARCT. PT HAD SOME SEIZURE LIKE ACTIVITY. SPOKE TO DR YA
ABOUT THIS. HE IS AWARE. SEE PREVIOUS NOTES FOR DETAILS. PT SIGNIFICANT OTHER,
SIBLINGS, AND MOTHER ALL PRESENT FOR THE NEUROLOGIST VISIT; THEY WERE ABLE TO
ASK QUESTIONS AND STATED THEY HAVE NO FURTHER QUESTIONS AT THIS TIME. PT
SLEEPING AT THIS TIME WITH ALL OF THE AFORE MENTIONED FAMILY AT THE BEDSIDE.
PT LEFT ISDED NEGLECT AND WEAKNESS INCREASED IN THE MIDDLE OF THE DAY TODAY,
SEE SHIFT ASSESSMENT DONE ONCE IN THE MORNING AND THEN AGAIN AFTER THE CHANGE.

## 2019-06-06 NOTE — NUR
CALLED DR ARREOLA-
PT FAMILY REQUESTED A NEUROLOGY CONSULT FOR THIS NEW UNDETERMINED ACTIVITY, PT
WEAKNESS HAS INCREASED SINCE THE START OF THE SHIFT, THIS COULD BE EVOLUTION
OF THE STROKE, REQUESTED A REPEAT HEAD CT AND NEUROLOGY CONSULT. DR ARREOLA
WILL ATTEMPT TO CONSULT NEUROLOGY IF AVAILABLE.

## 2019-06-06 NOTE — NUR
PT HAD SOME ACTIVITY THAT LOOKED LIKE A SEIZURE. PT EYES ROLLED BACK,
BREATHING BECAME EXTREMELY SHALLOW AND RAPID, HEAD TURNED TO THE LEFT AND FACE
PULLED TO THE LEFT, ARMS PULLED IN TOWARDS THE PT FACE AND HE MADE SMALL
TWITCHES AND JERKS. PT WAS NON-RESPONSIVE DURRING THE EVENT. PT VITALS TAKEN
POST EVENT, . PT RESP RATE 24. WHEN ASKED IF HE REMEMBERED WHAT JUST
HAPPENED, PT STATED "I COULDN'T BREATHE." DR ARREOLA NOTIFIED.

## 2019-06-07 LAB
ALBUMIN SERPL BCP-MCNC: 2.5 G/DL (ref 3.4–5)
ANION GAP SERPL CALCULATED.4IONS-SCNC: 6 MMOL/L (ref 6–16)
BASOPHILS # BLD AUTO: 0.01 K/MM3 (ref 0–0.23)
BASOPHILS NFR BLD AUTO: 0 % (ref 0–2)
BUN SERPL-MCNC: 19 MG/DL (ref 8–24)
CALCIUM SERPL-MCNC: 8.2 MG/DL (ref 8.5–10.1)
CHLORIDE SERPL-SCNC: 98 MMOL/L (ref 98–108)
CO2 SERPL-SCNC: 25 MMOL/L (ref 21–32)
CREAT SERPL-MCNC: 0.68 MG/DL (ref 0.6–1.2)
DEPRECATED RDW RBC AUTO: 69.8 FL (ref 35.1–46.3)
EOSINOPHIL # BLD AUTO: 0.02 K/MM3 (ref 0–0.68)
EOSINOPHIL NFR BLD AUTO: 0 % (ref 0–6)
ERYTHROCYTE [DISTWIDTH] IN BLOOD BY AUTOMATED COUNT: 19.3 % (ref 11.7–14.2)
GLUCOSE SERPL-MCNC: 84 MG/DL (ref 70–99)
HCT VFR BLD AUTO: 25.7 % (ref 37–53)
HGB BLD-MCNC: 8.9 G/DL (ref 13.5–17.5)
IMM GRANULOCYTES # BLD AUTO: 0.05 K/MM3 (ref 0–0.1)
IMM GRANULOCYTES NFR BLD AUTO: 1 % (ref 0–1)
LYMPHOCYTES # BLD AUTO: 1.65 K/MM3 (ref 0.84–5.2)
LYMPHOCYTES NFR BLD AUTO: 31 % (ref 21–46)
MCHC RBC AUTO-ENTMCNC: 34.6 G/DL (ref 31.5–36.5)
MCV RBC AUTO: 98 FL (ref 80–100)
MONOCYTES # BLD AUTO: 0.32 K/MM3 (ref 0.16–1.47)
MONOCYTES NFR BLD AUTO: 6 % (ref 4–13)
NEUTROPHILS # BLD AUTO: 3.31 K/MM3 (ref 1.96–9.15)
NEUTROPHILS NFR BLD AUTO: 62 % (ref 41–73)
NRBC # BLD AUTO: 0 K/MM3 (ref 0–0.02)
NRBC BLD AUTO-RTO: 0 /100 WBC (ref 0–0.2)
PHOSPHATE SERPL-MCNC: 2.7 MG/DL (ref 2.5–4.9)
PLATELET # BLD AUTO: 145 K/MM3 (ref 150–400)
POTASSIUM SERPL-SCNC: 4.1 MMOL/L (ref 3.5–5.5)
SODIUM SERPL-SCNC: 129 MMOL/L (ref 136–145)

## 2019-06-07 NOTE — NUR
PT WALKED THE WILHELM WITH PHYSICAL THERAPY AND RN. PT DID THREE LAPS IN THE SCU,
AT THE END HE SAID HE WAS QUITE TIRED. WAS ABLE TO WALK WITH STANDBY GUARD
ASSIST. WHEN HE RETURNED TO HIS ROOM HE REQUESTED TO GO TO BED. CNA WENT TO
TAKE VITALS SHORTLY AFTER THAT, HR WAS 61 BP 97/44. POST ACTIVITY PT BP WAS
THIS LOW. CALLED DR ARREOLA AND REQUESTED A CALL BACK, REGUARDING PT LOW BP.

## 2019-06-07 NOTE — NUR
ASSUMED CARE OF PT-
RECIEVED REPORT FROM NIGHT RN VINEET. PER REPORT PT SLEPT FOR SIX HOURS STRAIGHT
AT THE START OF THE SHIFT. PT STATES HE DOESN'T FEEL LIKE HE GOT THAT MUCH
SLEEP, STILL SEEMS GROGGY; HOWEVER PT IS MORE ALERT, EYES OPEN, TALKING TO
STAFF, TAKING DIRECTION ETC. PT MOTHER IS AT THE BEDSIDE. PER REPORT NO MORE
SEIZURE ACTIVITY WAS SEEN T/O THE NIGHT. PT REQUESTED IBUPROFEN THIS MORNING
STATES HE HAS A HEADACHE 4/10 ON THE RIGHT SIDE OF HIS HEAD IN THE TEMPORAL
AREA. WILL MEDICATE PRN WITH TYLENOL. PT HAS MODERATE  STRENGTH IN THE
LEFT HAND WHERE THERE WAS NONE YESTERDAY AFTERNOON AND EVENING. PT HAS LEFT
SIDED NEGLECT BUT FAR LESS THAN YESTERDAY.

## 2019-06-07 NOTE — NUR
06/07/19 0600  LOW GRADE TEMP THIS SHIFT. NO SEIZURE ACTIVITY NOTED THIS
SHIFT. PT ON CONTINUAL VIDEO MONITORING.  NO COMPLAINTS OF S/S OR DISCOMFORT.

## 2019-06-07 NOTE — NUR
SHIFT SUMMARY-
SPOKE TO DR ARREOLA ABOUT PT BP NO IVF ORDERED AT THIS TIME, ORDER TO PUSH PO
FLUIDS. PT FED HIMSELF DINNER WITH MINIMAL CUEING, DROOLING HAS LESTENED WHILE
THE PT IS EATING, LEFT DROOP AND NEGLECT STILL NOTABLE HOWEVER PT IS DOING FAR
BETTER.

## 2019-06-07 NOTE — NUR
ASSISTED PT OUT OF BED AND BACK TO BED. MINIMAL ASSIST, PT ABLE TO FOLLOW
DIRECTION, LEFT  STRENGTH 75% OF WHAT THE RIGHT IS. PT STILL HAS LEFT
SIDED NEGLECT BUT CAN OVERCOME IT WITH INSTRUCTION AND REMINDERS. PT CURRENTLY
SLEEPING IN BED, MOTHER JUST LEFT TO GO HOME.

## 2019-06-08 LAB
ALBUMIN SERPL BCP-MCNC: 2.6 G/DL (ref 3.4–5)
ANION GAP SERPL CALCULATED.4IONS-SCNC: 8 MMOL/L (ref 6–16)
BUN SERPL-MCNC: 21 MG/DL (ref 8–24)
CALCIUM SERPL-MCNC: 8.4 MG/DL (ref 8.5–10.1)
CHLORIDE SERPL-SCNC: 97 MMOL/L (ref 98–108)
CO2 SERPL-SCNC: 25 MMOL/L (ref 21–32)
CREAT SERPL-MCNC: 0.72 MG/DL (ref 0.6–1.2)
GLUCOSE SERPL-MCNC: 88 MG/DL (ref 70–99)
MAGNESIUM SERPL-MCNC: 1.2 MG/DL (ref 1.6–2.4)
PHOSPHATE SERPL-MCNC: 2.6 MG/DL (ref 2.5–4.9)
POTASSIUM SERPL-SCNC: 3.8 MMOL/L (ref 3.5–5.5)
SODIUM SERPL-SCNC: 130 MMOL/L (ref 136–145)

## 2019-06-08 NOTE — NUR
THE PT IS ALERT VERY DROWSEY AND LETHARGIC THIS AM AND HAS BEEN T/O THE DAY,
THE PT ACCORDING TO THE FAMILY IS MUCH WEAKER TODAY COMPARED TO YESTERDAY, THE
PT HAD DIFFICULTY SWALLOWING THIS AM. DR. ARREOLA WAS CALLED AND THE PT WAS
STARTED ON CLINIMIX TO HELP WITH ELECTROLYTE IMBALANCE, THE PT WAS GIVEN A BED
BATH THIS AM AND HAS SLEPT T/O THE DAY SUCTION AT THE BEDSIDE FOR SECRETIONS
AS NEEDED, MULTIPLE FAMILY IS AT THE BEDSIDE, CALL LIGHT IN REACH, WILL
CONTINUE TO MONITOR AND ASSESS FOR CHANGES

## 2019-06-08 NOTE — NUR
THIS AM
THIS AM THE PT WAS HARD TO AROUSE OPENED HIS EYES SLOWLY, REPORTED THAT HE WAS
TIRED AND JUST DIDNT FEEL GOOD OVERALL, TRIED TO ASSIST THE PT UP TO THE CHAIR
HE WAS UNABLE TO SUPPORT HIMSELF ON THE SIDE OF THE BED

## 2019-06-08 NOTE — NUR
SHIFT SUMMARY:  PT IS ALERT AND ORIENTED BUT LETHARGIC.  PT IS A ONE PERSON
ASSIST FOR TRANSFERS.  FAMILY AND FRIENDS IN VISITING AT THE START OF SHIFT.
PT DOES NOT USE HIS CALL LIGHT, SET THE BED ALARM OFF ON ONE OCCASION.  PT
SLEPT MUCH OF THE NIGHT WHEN NOT DISTURBED.  PT DENIES PAIN, NAUSEA, VOMITING,
AND SOB.  NO ACUTE CHANGES OR COMPLICATIONS THIS SHIFT.  BED IN LOW POSITION,
CALL LIGHT WITHIN REACH.

## 2019-06-09 NOTE — NUR
AT ABOUT 2000 FULL PT ASSESSMENT COMPLETED, PT HAD VIEW SCORE OF 5 AT 1941,
DR. ARREOLA AWARE OF THESE VITALS, AND AT 2000 VIEW SCORE IS 7. PT VITAL SIGNS
NOT SHOWING MUCH IMPROVEMENT AFTER GIVEN FENTANAYL ORDERED BY ELBA AND PT IS
LETHARGIC, IN VISABLE DISTRESS, AND NOT RESPONDING TO QUESTIONS OR OPENING
EYES. MANY FAMILY MEMBERS AT BEDSIDE AND FAMILY MADE AWARE THAT A RAPID
RESPONSE WOULD NEED TO BE CALLED BY THIS RN BECAUSE OF THE PT'S STATUS.
PT FAMILY REFUSED A RAPID RESPONSE TO BE CALLED AT THIS TIME AND ASKED THAT PT
BE PUT ON COMFORT MEASURES. CHARGE RN MELLISA NOTIFIED OF PT STATUS, AND CHOICE
OF FAMILY TO NOT CALL A RR.
MICHAEL GAO,NOTIFIED OF PT STATUS SINCE SHIFT START AND TREND AS WELL AS PT
FAMILY COMFORT WISHES.  MICHAEL GAO VERBALIZED MEDICATION ORDERS FOR COMFORT AND
SAYS THAT HE WILL BE BY TO SEE THE PT AND PUT IN COMFORT CARE ORDERS AT HIS
NEXT AVALIBILITY. WILL CONTINUE TO MONITOR PT AND FAMILY FOR COMFORT.

## 2019-06-09 NOTE — NUR
PT HAS SHALLOW, SLOW BREATHING. DOES NOT APPEAR TO SHOW DISCOMFORT, SKIN IS
PALE. PT COMFORT MANAGED TO FAMILY'S APPROVAL AT THIS TIME. CHARGE RN MELLISA
MADE AWARE THAT PT APPEARS CLOSE TO PASSING. WILL CTM PT AND FAMILY NEEDS.

## 2019-06-09 NOTE — NUR
MICHAEL NP ABLE TO WRITE CC ORDERS AT ABOUT 2229. PT GIVEN MEDS PER EMAR FOR
COMFORT AND FAMILY MEMBERS IN ROOM. STAFF OFFERED TO TURN AND CHANGE PT, BUT
PT FAMILY REFUSED X2. AT 2350 PT PASSED, MELLISA SAAVEDRA RN MADE AWARE AND
NURSING SUPERVISOR CALLED. DR. GALARZA ALSO MADE AWARE.

## 2019-06-09 NOTE — NUR
REPORT RECIEVED FROM DAY RN JOSHUA OUTSIDE OF PT ROOM. JOSHUA REPORTS THAT
PT BP HAS BEEN HYPOTENSIVE AND PT VERY LETHARGIC, AND WEAK TODAY. HE ALSO
REPORTS THAT DR. WOODS HAS BEEN CALLED TO COME CHECK ON PT STATUS. PER
JOSHUA PT WAKES AND IS ABLE TO ANSWER SOME QUESTIONS. DR. ARREOLA IN TO SEE PT
AT ABOUT 1958, SEE MD ASSESSMENT, PT MADE DNR.
DR. ARREOLA ALSO ASKED AT THIS TIME BY THIS RN ABOUT PT STATUS AND CONSIDERING
COMFORT CARE. DR. ARREOLA ENCOURAGED MORE MONITORING AND GIVING FENTANYAL FOR
PAIN.